# Patient Record
Sex: FEMALE | Race: WHITE | NOT HISPANIC OR LATINO
[De-identification: names, ages, dates, MRNs, and addresses within clinical notes are randomized per-mention and may not be internally consistent; named-entity substitution may affect disease eponyms.]

---
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
## 2018-01-08 ENCOUNTER — APPOINTMENT (OUTPATIENT)
Dept: OBGYN | Facility: CLINIC | Age: 18
End: 2018-01-08
Payer: COMMERCIAL

## 2018-01-08 PROCEDURE — 99214 OFFICE O/P EST MOD 30 MIN: CPT

## 2018-03-28 ENCOUNTER — APPOINTMENT (OUTPATIENT)
Dept: OBGYN | Facility: CLINIC | Age: 18
End: 2018-03-28

## 2018-05-11 ENCOUNTER — APPOINTMENT (OUTPATIENT)
Dept: OBGYN | Facility: CLINIC | Age: 18
End: 2018-05-11
Payer: COMMERCIAL

## 2018-05-11 PROCEDURE — 99213 OFFICE O/P EST LOW 20 MIN: CPT

## 2018-06-15 ENCOUNTER — APPOINTMENT (OUTPATIENT)
Dept: OBGYN | Facility: CLINIC | Age: 18
End: 2018-06-15
Payer: COMMERCIAL

## 2018-06-15 PROCEDURE — 58300 INSERT INTRAUTERINE DEVICE: CPT

## 2018-06-15 PROCEDURE — 76998 US GUIDE INTRAOP: CPT

## 2018-06-15 PROCEDURE — 81025 URINE PREGNANCY TEST: CPT

## 2018-07-17 ENCOUNTER — APPOINTMENT (OUTPATIENT)
Dept: OBGYN | Facility: CLINIC | Age: 18
End: 2018-07-17
Payer: COMMERCIAL

## 2018-07-17 PROCEDURE — 99213 OFFICE O/P EST LOW 20 MIN: CPT

## 2019-01-14 ENCOUNTER — APPOINTMENT (OUTPATIENT)
Dept: OBGYN | Facility: CLINIC | Age: 19
End: 2019-01-14
Payer: COMMERCIAL

## 2019-01-14 PROCEDURE — 99213 OFFICE O/P EST LOW 20 MIN: CPT

## 2020-01-07 ENCOUNTER — RESULT REVIEW (OUTPATIENT)
Age: 20
End: 2020-01-07

## 2020-01-08 ENCOUNTER — APPOINTMENT (OUTPATIENT)
Dept: OBGYN | Facility: CLINIC | Age: 20
End: 2020-01-08
Payer: COMMERCIAL

## 2020-01-08 PROCEDURE — 99214 OFFICE O/P EST MOD 30 MIN: CPT | Mod: 25

## 2020-01-08 PROCEDURE — 99395 PREV VISIT EST AGE 18-39: CPT

## 2022-09-13 NOTE — ED ADULT NURSE NOTE - HARM RISK FACTORS
Received fax from patient's pharmacy advising that Semaglutide, 2 MG/DOSE, 8 MG/3ML Solution Pen-injector is on a nationwide backorder at this strength with no release date    Please advise on how to proceed   no